# Patient Record
Sex: FEMALE | Race: WHITE | Employment: OTHER | URBAN - METROPOLITAN AREA
[De-identification: names, ages, dates, MRNs, and addresses within clinical notes are randomized per-mention and may not be internally consistent; named-entity substitution may affect disease eponyms.]

---

## 2019-05-03 ENCOUNTER — TRANSFERRED RECORDS (OUTPATIENT)
Dept: HEALTH INFORMATION MANAGEMENT | Facility: CLINIC | Age: 74
End: 2019-05-03

## 2020-06-13 ENCOUNTER — OFFICE VISIT (OUTPATIENT)
Dept: FAMILY MEDICINE | Facility: OTHER | Age: 75
End: 2020-06-13
Attending: NURSE PRACTITIONER
Payer: MEDICARE

## 2020-06-13 VITALS
HEART RATE: 76 BPM | WEIGHT: 180.6 LBS | OXYGEN SATURATION: 92 % | DIASTOLIC BLOOD PRESSURE: 80 MMHG | TEMPERATURE: 97.8 F | SYSTOLIC BLOOD PRESSURE: 122 MMHG | RESPIRATION RATE: 16 BRPM

## 2020-06-13 DIAGNOSIS — M10.079 ACUTE IDIOPATHIC GOUT OF FOOT, UNSPECIFIED LATERALITY: Primary | ICD-10-CM

## 2020-06-13 PROCEDURE — G0463 HOSPITAL OUTPT CLINIC VISIT: HCPCS

## 2020-06-13 PROCEDURE — 99203 OFFICE O/P NEW LOW 30 MIN: CPT | Performed by: NURSE PRACTITIONER

## 2020-06-13 RX ORDER — CALCITRIOL 0.5 UG/1
CAPSULE, LIQUID FILLED ORAL
COMMUNITY
Start: 2019-10-11

## 2020-06-13 RX ORDER — POTASSIUM CHLORIDE 1500 MG/1
TABLET, EXTENDED RELEASE ORAL
COMMUNITY
Start: 2019-12-17

## 2020-06-13 RX ORDER — PIOGLITAZONEHYDROCHLORIDE 15 MG/1
TABLET ORAL
COMMUNITY
Start: 2020-03-30

## 2020-06-13 RX ORDER — METOPROLOL SUCCINATE 50 MG/1
50 TABLET, EXTENDED RELEASE ORAL
COMMUNITY

## 2020-06-13 RX ORDER — BLOOD SUGAR DIAGNOSTIC
STRIP MISCELLANEOUS
COMMUNITY
Start: 2020-01-23

## 2020-06-13 RX ORDER — FUROSEMIDE 40 MG
TABLET ORAL
COMMUNITY
Start: 2020-03-30

## 2020-06-13 RX ORDER — GLIPIZIDE 5 MG/1
TABLET ORAL
COMMUNITY
Start: 2019-11-20

## 2020-06-13 RX ORDER — SULFASALAZINE 500 MG/1
1000 TABLET ORAL
COMMUNITY

## 2020-06-13 RX ORDER — ASPIRIN 81 MG/1
81 TABLET ORAL
COMMUNITY

## 2020-06-13 RX ORDER — NITROGLYCERIN 0.4 MG/1
0.4 TABLET SUBLINGUAL
COMMUNITY
Start: 2019-04-12

## 2020-06-13 RX ORDER — AMLODIPINE BESYLATE 10 MG/1
TABLET ORAL
COMMUNITY
Start: 2019-11-21

## 2020-06-13 RX ORDER — ROSUVASTATIN CALCIUM 5 MG/1
TABLET, COATED ORAL
COMMUNITY
Start: 2020-04-06

## 2020-06-13 RX ORDER — COLCHICINE 0.6 MG/1
TABLET ORAL
Qty: 20 TABLET | Refills: 0 | Status: SHIPPED | OUTPATIENT
Start: 2020-06-13

## 2020-06-13 RX ORDER — SULFASALAZINE 500 MG/1
TABLET ORAL
COMMUNITY
Start: 2020-06-09

## 2020-06-13 ASSESSMENT — PAIN SCALES - GENERAL: PAINLEVEL: EXTREME PAIN (9)

## 2020-06-13 NOTE — PATIENT INSTRUCTIONS
Patient Education     Treating Gout Attacks     Raising the joint above the level of your heart can help reduce gout symptoms.     Gout is a disease that affects the joints. It is caused by excess uric acid in your blood that may lead to crystals forming in your joints. Left untreated, it can lead to painful foot and joint deformities and even kidney problems. But, by treating gout early, you can relieve pain and help prevent future problems. Gout can usually be treated with medicine and proper diet. In severe cases, surgery may be needed.  Gout attacks are painful and often happen more than once. Taking medicines may reduce pain and prevent attacks in the future. There are also some things you can do at home to relieve symptoms.  Medicines for gout  Your healthcare provider may prescribe a daily medicine to reduce levels of uric acid. Reducing your uric acid levels may help prevent gout attacks. Allopurinol is one commonly used medicine taken daily to reduce uric acid levels. Other daily medicines used to reduce uric acid levels include febuxostat, lesinurad, and probencid. Other medicines can help relieve pain and swelling during an acute attack. Medicines such as NSAIDs (nonsteroidal anti-inflammatory medicines), steroids, and colchicine may be prescribed for intermittent use to relieve an acute gout attack. Be sure to take your medicine as directed.  What you can do  Below are some things you can do at home to relieve gout symptoms. Your healthcare provider may have other tips.    Rest the painful joint as much as you can.    Raise the painful joint so it is at a level higher than your heart.    Use ice for 10 minutes every 1 to 2 hours as possible.  How can I prevent gout?  With a little effort, you may be able to prevent gout attacks in the future. Here are some things you can do:    Don't eat foods high in purines  ? Certain meats (red meat, processed meat, turkey)  ? Organ meats (kidney, liver,  sweetbread)  ? Shellfish (lobster, crab, shrimp, scallop, mussel)  ? Certain fish (anchovy, sardine, herring, mackerel)    Take any medicines prescribed by your healthcare provider.    Lose weight if you need to.    Reduce high fructose corn syrup in meals and drinks.    Reduce or cut out alcohol, particularly beer, but also red wine and spirits.    Control blood pressure, diabetes, and cholesterol.    Drink plenty of water to help flush uric acid from your body.  Date Last Reviewed: 4/1/2018 2000-2019 [employer]. [address] [place] [address]. All rights reserved. This information is not intended as a substitute for professional medical care. Always follow your healthcare professional's instructions.

## 2020-06-13 NOTE — NURSING NOTE
Patient presents to the clinic today for possible gout.  Patient states she has had gout before and it feels similar.  Jo Weiss LPN 6/13/2020   4:12 PM    Chief Complaint   Patient presents with     Arthritis     Medication Reconciliation: complete  Jo Weiss LPN

## 2020-06-13 NOTE — PROGRESS NOTES
Nursing Notes:   Jo Weiss LPN  6/13/2020  4:19 PM  Signed  Patient presents to the clinic today for possible gout.  Patient states she has had gout before and it feels similar.  Jo Weiss LPN 6/13/2020   4:12 PM    Chief Complaint   Patient presents with     Arthritis     Medication Reconciliation: complete  Jo Weiss LPN      SUBJECTIVE:   Meseret Walker is a 74 year old female who presents to clinic today for the following health issues:    Patient [facility] to [facility] for evaluation of bilateral foot pain.  Her right is worse than her left.  It started a day ago.  It has rapidly worsened.  She does have a history of chronic kidney disease and gout.  She denies injury.  She reports right foot swelling and redness by her great toe.  She reports that her feet are so painful that she at bedtime is exquisitely tender.  She denies fevers, chills, signs or symptoms of systemic illness.      Problem list and histories reviewed & adjusted, as indicated.  Additional history: as documented    There is no problem list on file for this patient.    History reviewed. No pertinent surgical history.    Social History     Tobacco Use     Smoking status: Never Smoker     Smokeless tobacco: Never Used   Substance Use Topics     Alcohol use: Not Currently     History reviewed. No pertinent family history.      Current Outpatient Medications   Medication Sig Dispense Refill     amLODIPine (NORVASC) 10 MG tablet        aspirin 81 MG EC tablet Take 81 mg by mouth       calcitRIOL (ROCALTROL) 0.5 MCG capsule        Calcium Carb-Cholecalciferol 600-800 MG-UNIT CHEW Take 1 tablet by mouth       Calcium Citrate-Vitamin D 250-200 MG-UNIT TABS        colchicine (COLCYRS) 0.6 MG tablet Take 1 tablet and repeat in 2 hours. Then take one tablet daily for 5-7 days. 20 tablet 0     furosemide (LASIX) 40 MG tablet        glipiZIDE (GLUCOTROL) 5 MG tablet        metoprolol succinate ER (TOPROL-XL) 50 MG 24 hr  tablet Take 50 mg by mouth       nitroGLYcerin (NITROSTAT) 0.4 MG sublingual tablet Place 0.4 mg under the tongue       ONETOUCH VERIO IQ test strip CHECK GLUCOSE TWICE DAILY       pioglitazone (ACTOS) 15 MG tablet        potassium chloride ER (KLOR-CON M) 20 MEQ CR tablet        rosuvastatin (CRESTOR) 5 MG tablet        sulfaSALAzine (AZULFIDINE) 500 MG tablet Take 1,000 mg by mouth       sulfaSALAzine (AZULFIDINE) 500 MG tablet        Allergies   Allergen Reactions     Diagnostic X-Ray Materials Nausea     IV dye - flushed, nauseous     Seasonal Allergies          ROS:  Notable findings in the HPI.       OBJECTIVE:     /80 (BP Location: Right arm, Patient Position: Sitting, Cuff Size: Adult Regular)   Pulse 76   Temp 97.8  F (36.6  C) (Tympanic)   Resp 16   Wt 81.9 kg (180 lb 9.6 oz)   LMP  (LMP Unknown)   SpO2 92%   Breastfeeding No   There is no height or weight on file to calculate BMI.  GENERAL: healthy and alert  EYES: Eyes grossly normal to inspection  RESP: without increased work of breathing.   CV: peripheral pulses strong and 1+ right lower extremity pitting edema to foot     MS: Right foot more swollen than the left foot.  She does have some mild erythema around the base of the great toe on the right foot.  All her toes are tender to palpate.  Her ankle is not tender to palpate.  She does have pain on the mid foot right side.  Pulses are adequate on both sides.  Left foot without redness, swelling or bruising.  She does report pain in her toes.  SKIN: no suspicious lesions or rashes  NEURO: Normal strength and tone, mentation intact and speech normal  PSYCH: mentation appears normal, affect normal/bright    Diagnostic Test Results:  none     ASSESSMENT/PLAN:     1. Acute idiopathic gout of foot, unspecified laterality  - colchicine (COLCYRS) 0.6 MG tablet; Take 1 tablet and repeat in 2 hours. Then take one tablet daily for 5-7 days.  Dispense: 20 tablet; Refill: 0    PLAN:    Did discuss  treatment plan including using prednisone versus colchicine.  At this point patient would rather use colchicine.  Instructions as above.  Described expectation and management of gou  If she is not seeing improvement on the colchicine after 3 to 4 days she will stop this medication and start prednisone.  She has prednisone at home.  I did recommend that she take 40 mg daily for 5 days.  However at this point she will use home remedies such as elevation, ice, heat and start the colchicine.  She will follow-up if needed.    Followup:    If not improving or if condition worsens, follow up with your Primary Care Provider    I explained my diagnostic considerations and recommendations to the patient, who voiced understanding and agreement with the treatment plan. All questions were answered. We discussed potential side effects of any prescribed or recommended therapies, as well as expectations for response to treatments. She was advised to contact our office if there is no improvement or worsening of conditions or symptoms.  If s/s worsen or persist, patient will either come back or follow up with PCP.    Disclaimer:  This note consists of words and symbols derived from keyboarding, dictation, or using voice recognition software. As a result, there may be errors in the script that have gone undetected. Please consider this when interpreting information found in this note.      Amanda Li NP, 6/13/2020 4:40 PM

## 2020-07-07 ENCOUNTER — MEDICAL CORRESPONDENCE (OUTPATIENT)
Dept: HEALTH INFORMATION MANAGEMENT | Facility: OTHER | Age: 75
End: 2020-07-07

## 2020-07-07 DIAGNOSIS — R53.83 FATIGUE: Primary | ICD-10-CM

## 2020-07-07 DIAGNOSIS — Z79.899 ENCOUNTER FOR LONG-TERM (CURRENT) USE OF OTHER MEDICATIONS: ICD-10-CM

## 2020-07-07 LAB
ALBUMIN SERPL-MCNC: 4.1 G/DL (ref 3.5–5.7)
ALP SERPL-CCNC: 87 U/L (ref 34–104)
ALT SERPL W P-5'-P-CCNC: 9 U/L (ref 7–52)
ANION GAP SERPL CALCULATED.3IONS-SCNC: 9 MMOL/L (ref 3–14)
AST SERPL W P-5'-P-CCNC: 13 U/L (ref 13–39)
BASOPHILS # BLD AUTO: 0 10E9/L (ref 0–0.2)
BASOPHILS NFR BLD AUTO: 0.5 %
BILIRUB SERPL-MCNC: 0.4 MG/DL (ref 0.3–1)
BUN SERPL-MCNC: 23 MG/DL (ref 7–25)
CALCIUM SERPL-MCNC: 8.6 MG/DL (ref 8.6–10.3)
CHLORIDE SERPL-SCNC: 106 MMOL/L (ref 98–107)
CO2 SERPL-SCNC: 27 MMOL/L (ref 21–31)
CREAT SERPL-MCNC: 1.18 MG/DL (ref 0.6–1.2)
CRP SERPL-MCNC: 2 MG/L
DIFFERENTIAL METHOD BLD: ABNORMAL
EOSINOPHIL # BLD AUTO: 0.2 10E9/L (ref 0–0.7)
EOSINOPHIL NFR BLD AUTO: 2.7 %
ERYTHROCYTE [DISTWIDTH] IN BLOOD BY AUTOMATED COUNT: 13.8 % (ref 10–15)
ERYTHROCYTE [SEDIMENTATION RATE] IN BLOOD BY WESTERGREN METHOD: 51 MM/H (ref 1–15)
GFR SERPL CREATININE-BSD FRML MDRD: 45 ML/MIN/{1.73_M2}
GLUCOSE SERPL-MCNC: 172 MG/DL (ref 70–105)
HCT VFR BLD AUTO: 36.3 % (ref 35–47)
HGB BLD-MCNC: 11.3 G/DL (ref 11.7–15.7)
IMM GRANULOCYTES # BLD: 0 10E9/L (ref 0–0.4)
IMM GRANULOCYTES NFR BLD: 0.5 %
LYMPHOCYTES # BLD AUTO: 1 10E9/L (ref 0.8–5.3)
LYMPHOCYTES NFR BLD AUTO: 16.7 %
MCH RBC QN AUTO: 28.6 PG (ref 26.5–33)
MCHC RBC AUTO-ENTMCNC: 31.1 G/DL (ref 31.5–36.5)
MCV RBC AUTO: 92 FL (ref 78–100)
MONOCYTES # BLD AUTO: 0.5 10E9/L (ref 0–1.3)
MONOCYTES NFR BLD AUTO: 8.2 %
NEUTROPHILS # BLD AUTO: 4.3 10E9/L (ref 1.6–8.3)
NEUTROPHILS NFR BLD AUTO: 71.4 %
PLATELET # BLD AUTO: 271 10E9/L (ref 150–450)
POTASSIUM SERPL-SCNC: 3.9 MMOL/L (ref 3.5–5.1)
PROT SERPL-MCNC: 7.1 G/DL (ref 6.4–8.9)
RBC # BLD AUTO: 3.95 10E12/L (ref 3.8–5.2)
SODIUM SERPL-SCNC: 142 MMOL/L (ref 134–144)
URATE SERPL-MCNC: 4.9 MG/DL (ref 4.4–7.6)
WBC # BLD AUTO: 6 10E9/L (ref 4–11)

## 2020-07-07 PROCEDURE — 36415 COLL VENOUS BLD VENIPUNCTURE: CPT | Mod: ZL

## 2020-07-07 PROCEDURE — 85025 COMPLETE CBC W/AUTO DIFF WBC: CPT | Mod: ZL

## 2020-07-07 PROCEDURE — 86140 C-REACTIVE PROTEIN: CPT | Mod: ZL

## 2020-07-07 PROCEDURE — 84550 ASSAY OF BLOOD/URIC ACID: CPT | Mod: ZL

## 2020-07-07 PROCEDURE — 80053 COMPREHEN METABOLIC PANEL: CPT | Mod: ZL

## 2020-07-07 PROCEDURE — 85652 RBC SED RATE AUTOMATED: CPT | Mod: ZL

## 2020-09-21 ENCOUNTER — MEDICAL CORRESPONDENCE (OUTPATIENT)
Dept: HEALTH INFORMATION MANAGEMENT | Facility: OTHER | Age: 75
End: 2020-09-21

## 2020-09-21 DIAGNOSIS — L40.50 PSORIATIC ARTHROPATHY (H): Primary | ICD-10-CM

## 2020-09-21 LAB
ALBUMIN SERPL-MCNC: 4.3 G/DL (ref 3.5–5.7)
ALP SERPL-CCNC: 91 U/L (ref 34–104)
ALT SERPL W P-5'-P-CCNC: 8 U/L (ref 7–52)
ANION GAP SERPL CALCULATED.3IONS-SCNC: 9 MMOL/L (ref 3–14)
AST SERPL W P-5'-P-CCNC: 17 U/L (ref 13–39)
BASOPHILS # BLD AUTO: 0 10E9/L (ref 0–0.2)
BASOPHILS NFR BLD AUTO: 0.7 %
BILIRUB SERPL-MCNC: 0.3 MG/DL (ref 0.3–1)
BUN SERPL-MCNC: 20 MG/DL (ref 7–25)
CALCIUM SERPL-MCNC: 7.8 MG/DL (ref 8.6–10.3)
CHLORIDE SERPL-SCNC: 103 MMOL/L (ref 98–107)
CO2 SERPL-SCNC: 29 MMOL/L (ref 21–31)
CREAT SERPL-MCNC: 1.37 MG/DL (ref 0.6–1.2)
CRP SERPL-MCNC: 8.4 MG/L
DIFFERENTIAL METHOD BLD: NORMAL
EOSINOPHIL # BLD AUTO: 0.2 10E9/L (ref 0–0.7)
EOSINOPHIL NFR BLD AUTO: 2.7 %
ERYTHROCYTE [DISTWIDTH] IN BLOOD BY AUTOMATED COUNT: 14.3 % (ref 10–15)
ERYTHROCYTE [SEDIMENTATION RATE] IN BLOOD BY WESTERGREN METHOD: 44 MM/H (ref 1–15)
GFR SERPL CREATININE-BSD FRML MDRD: 38 ML/MIN/{1.73_M2}
GLUCOSE SERPL-MCNC: 118 MG/DL (ref 70–105)
HCT VFR BLD AUTO: 37.2 % (ref 35–47)
HGB BLD-MCNC: 11.8 G/DL (ref 11.7–15.7)
IMM GRANULOCYTES # BLD: 0 10E9/L (ref 0–0.4)
IMM GRANULOCYTES NFR BLD: 0.4 %
LYMPHOCYTES # BLD AUTO: 1.7 10E9/L (ref 0.8–5.3)
LYMPHOCYTES NFR BLD AUTO: 29.7 %
MCH RBC QN AUTO: 29.2 PG (ref 26.5–33)
MCHC RBC AUTO-ENTMCNC: 31.7 G/DL (ref 31.5–36.5)
MCV RBC AUTO: 92 FL (ref 78–100)
MONOCYTES # BLD AUTO: 0.4 10E9/L (ref 0–1.3)
MONOCYTES NFR BLD AUTO: 6.2 %
NEUTROPHILS # BLD AUTO: 3.4 10E9/L (ref 1.6–8.3)
NEUTROPHILS NFR BLD AUTO: 60.3 %
PLATELET # BLD AUTO: 324 10E9/L (ref 150–450)
POTASSIUM SERPL-SCNC: 3.9 MMOL/L (ref 3.5–5.1)
PROT SERPL-MCNC: 6.9 G/DL (ref 6.4–8.9)
RBC # BLD AUTO: 4.04 10E12/L (ref 3.8–5.2)
SODIUM SERPL-SCNC: 141 MMOL/L (ref 134–144)
WBC # BLD AUTO: 5.7 10E9/L (ref 4–11)

## 2020-09-21 PROCEDURE — 85652 RBC SED RATE AUTOMATED: CPT | Mod: ZL

## 2020-09-21 PROCEDURE — 85025 COMPLETE CBC W/AUTO DIFF WBC: CPT | Mod: ZL

## 2020-09-21 PROCEDURE — 80053 COMPREHEN METABOLIC PANEL: CPT | Mod: ZL

## 2020-09-21 PROCEDURE — 36415 COLL VENOUS BLD VENIPUNCTURE: CPT | Mod: ZL

## 2020-09-21 PROCEDURE — 86140 C-REACTIVE PROTEIN: CPT | Mod: ZL

## 2022-05-11 ENCOUNTER — HOSPITAL ENCOUNTER (INPATIENT)
Dept: HOSPITAL 11 - JP.SDS | Age: 77
LOS: 2 days | Discharge: HOME | DRG: 470 | End: 2022-05-13
Attending: ORTHOPAEDIC SURGERY | Admitting: ORTHOPAEDIC SURGERY
Payer: MEDICARE

## 2022-05-11 DIAGNOSIS — M17.11: Primary | ICD-10-CM

## 2022-05-11 DIAGNOSIS — R94.31: ICD-10-CM

## 2022-05-11 DIAGNOSIS — E83.51: ICD-10-CM

## 2022-05-11 DIAGNOSIS — D64.89: ICD-10-CM

## 2022-05-11 DIAGNOSIS — Z91.041: ICD-10-CM

## 2022-05-11 DIAGNOSIS — Z79.899: ICD-10-CM

## 2022-05-11 DIAGNOSIS — E11.9: ICD-10-CM

## 2022-05-11 DIAGNOSIS — I10: ICD-10-CM

## 2022-05-11 PROCEDURE — C1776 JOINT DEVICE (IMPLANTABLE): HCPCS

## 2022-05-11 PROCEDURE — C1713 ANCHOR/SCREW BN/BN,TIS/BN: HCPCS

## 2022-05-11 PROCEDURE — 0SRC0J9 REPLACEMENT OF RIGHT KNEE JOINT WITH SYNTHETIC SUBSTITUTE, CEMENTED, OPEN APPROACH: ICD-10-PCS | Performed by: ORTHOPAEDIC SURGERY

## 2022-05-11 RX ADMIN — Medication SCH APPLIC: at 06:13

## 2022-05-11 RX ADMIN — Medication SCH APPLIC: at 21:59

## 2022-05-11 RX ADMIN — POTASSIUM CHLORIDE SCH MEQ: 750 CAPSULE, EXTENDED RELEASE ORAL at 22:00

## 2022-05-11 RX ADMIN — Medication SCH TAB: at 21:59

## 2022-05-11 RX ADMIN — Medication SCH MG: at 22:00

## 2022-05-12 RX ADMIN — POTASSIUM CHLORIDE SCH MEQ: 750 CAPSULE, EXTENDED RELEASE ORAL at 21:05

## 2022-05-12 RX ADMIN — GLIPIZIDE SCH MG: 5 TABLET, FILM COATED, EXTENDED RELEASE ORAL at 08:29

## 2022-05-12 RX ADMIN — POTASSIUM CHLORIDE SCH MEQ: 750 CAPSULE, EXTENDED RELEASE ORAL at 08:29

## 2022-05-12 RX ADMIN — Medication SCH TAB: at 08:28

## 2022-05-12 RX ADMIN — Medication SCH MG: at 21:04

## 2022-05-12 RX ADMIN — Medication SCH APPLIC: at 21:03

## 2022-05-12 RX ADMIN — Medication SCH APPLIC: at 08:27

## 2022-05-12 RX ADMIN — Medication SCH MG: at 08:29

## 2022-05-12 RX ADMIN — Medication SCH TAB: at 21:03

## 2022-05-13 RX ADMIN — POTASSIUM CHLORIDE SCH MEQ: 750 CAPSULE, EXTENDED RELEASE ORAL at 08:33

## 2022-05-13 RX ADMIN — Medication SCH APPLIC: at 08:35

## 2022-05-13 RX ADMIN — Medication SCH TAB: at 08:33

## 2022-05-13 RX ADMIN — GLIPIZIDE SCH MG: 5 TABLET, FILM COATED, EXTENDED RELEASE ORAL at 08:50

## 2022-05-13 RX ADMIN — Medication SCH MG: at 08:50

## 2022-05-15 ENCOUNTER — HOSPITAL ENCOUNTER (EMERGENCY)
Dept: HOSPITAL 11 - JP.ED | Age: 77
Discharge: HOME | End: 2022-05-15
Payer: MEDICARE

## 2022-05-15 DIAGNOSIS — Z91.041: ICD-10-CM

## 2022-05-15 DIAGNOSIS — Z96.651: ICD-10-CM

## 2022-05-15 DIAGNOSIS — Z79.899: ICD-10-CM

## 2022-05-15 DIAGNOSIS — G89.18: Primary | ICD-10-CM

## 2022-05-15 DIAGNOSIS — M25.531: ICD-10-CM

## 2022-05-15 DIAGNOSIS — E11.9: ICD-10-CM

## 2022-05-15 DIAGNOSIS — E78.00: ICD-10-CM

## 2022-05-15 DIAGNOSIS — I10: ICD-10-CM

## 2022-05-15 DIAGNOSIS — L13.9: ICD-10-CM

## 2022-05-15 DIAGNOSIS — I25.2: ICD-10-CM

## 2022-05-15 DIAGNOSIS — Z79.82: ICD-10-CM

## 2022-05-15 DIAGNOSIS — E66.9: ICD-10-CM

## 2022-05-15 DIAGNOSIS — R60.0: ICD-10-CM

## 2023-07-07 ENCOUNTER — HOSPITAL ENCOUNTER (EMERGENCY)
Dept: HOSPITAL 11 - JP.ED | Age: 78
Discharge: HOME | End: 2023-07-07
Payer: MEDICARE

## 2023-07-07 DIAGNOSIS — Z79.4: ICD-10-CM

## 2023-07-07 DIAGNOSIS — I25.2: ICD-10-CM

## 2023-07-07 DIAGNOSIS — Z91.09: ICD-10-CM

## 2023-07-07 DIAGNOSIS — Z91.041: ICD-10-CM

## 2023-07-07 DIAGNOSIS — E66.9: ICD-10-CM

## 2023-07-07 DIAGNOSIS — Z95.5: ICD-10-CM

## 2023-07-07 DIAGNOSIS — E21.3: ICD-10-CM

## 2023-07-07 DIAGNOSIS — Z79.82: ICD-10-CM

## 2023-07-07 DIAGNOSIS — E11.9: ICD-10-CM

## 2023-07-07 DIAGNOSIS — R07.89: Primary | ICD-10-CM

## 2023-07-07 DIAGNOSIS — E78.00: ICD-10-CM

## 2023-07-07 DIAGNOSIS — Z79.899: ICD-10-CM

## 2023-07-07 LAB
ANION GAP SERPL CALC-SCNC: 11.5 MMOL/L (ref 5–14)
BASOPHILS # BLD AUTO: 0.03 K/UL (ref 0–0.1)
BASOPHILS NFR BLD AUTO: 0.4 % (ref 0.1–1.3)
BUN SERPL-MCNC: 27 MG/DL (ref 7–18)
CALCIUM SERPL-MCNC: 8.1 MG/DL (ref 8.5–10.1)
CHLORIDE SERPL-SCNC: 102 MMOL/L (ref 100–108)
CO2 SERPL-SCNC: 31 MMOL/L (ref 21–32)
CREAT CL 24H UR+SERPL-VRATE: 23.65 ML/MIN
CREAT SERPL-MCNC: 1.7 MG/DL (ref 0.6–1)
EOSINOPHIL # BLD AUTO: 0.11 K/UL (ref 0–0.4)
EOSINOPHIL NFR BLD AUTO: 1.3 % (ref 0–5.4)
GLUCOSE SERPL-MCNC: 104 MG/DL (ref 74–106)
HCT VFR BLD AUTO: 35.6 % (ref 34.3–46)
HGB BLD-MCNC: 11.5 G/DL (ref 11.2–15.5)
IMM GRANULOCYTES # BLD: 0.12 K/UL (ref 0–0.23)
IMM GRANULOCYTES NFR BLD: 1.5 % (ref 0–0.7)
LYMPHOCYTES # BLD AUTO: 1.83 K/UL (ref 0.8–3.3)
LYMPHOCYTES NFR BLD AUTO: 22.3 % (ref 11.4–47.7)
MCH RBC QN AUTO: 30.7 PG (ref 31.6–35.5)
MCHC RBC AUTO-ENTMCNC: 32.3 G/DL (ref 31.6–35.5)
MCHC RBC AUTO-ENTMCNC: 94.9 FL (ref 81.4–99)
MONOCYTES # BLD AUTO: 0.64 K/UL (ref 0.2–0.9)
MONOCYTES NFR BLD AUTO: 7.8 % (ref 3.3–12.6)
NEUTROPHILS # BLD AUTO: 5.48 K/UL (ref 1–7.6)
NEUTROPHILS NFR BLD AUTO: 66.7 % (ref 40–78.1)
PLATELET # BLD AUTO: 316 K/UL (ref 130–375)
POTASSIUM SERPL-SCNC: 3.5 MMOL/L (ref 3.6–5.2)
RBC # BLD AUTO: 3.75 M/UL (ref 3.77–5.24)
SODIUM SERPL-SCNC: 141 MMOL/L (ref 140–148)
TROPONIN I SERPL HS-MCNC: 11 PG/ML (ref ?–60.3)
WBC # BLD AUTO: 8.2 K/UL (ref 3.2–11)

## 2023-09-20 ENCOUNTER — HOSPITAL ENCOUNTER (OUTPATIENT)
Dept: HOSPITAL 11 - JP.SDS | Age: 78
Discharge: HOME | End: 2023-09-20
Attending: ORTHOPAEDIC SURGERY
Payer: MEDICARE

## 2023-09-20 DIAGNOSIS — G47.33: ICD-10-CM

## 2023-09-20 DIAGNOSIS — N18.9: ICD-10-CM

## 2023-09-20 DIAGNOSIS — S83.242A: Primary | ICD-10-CM

## 2023-09-20 DIAGNOSIS — I25.10: ICD-10-CM
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
## 2023-09-20 DIAGNOSIS — E11.22: ICD-10-CM

## 2023-09-20 DIAGNOSIS — I12.9: ICD-10-CM

## 2023-09-20 LAB
ANION GAP SERPL CALC-SCNC: 13.2 MMOL/L (ref 5–14)
BUN SERPL-MCNC: 24 MG/DL (ref 7–18)
CALCIUM SERPL-MCNC: 7.4 MG/DL (ref 8.5–10.1)
CHLORIDE SERPL-SCNC: 99 MMOL/L (ref 100–108)
CO2 SERPL-SCNC: 29 MMOL/L (ref 21–32)
CREAT CL 24H UR+SERPL-VRATE: 23.65 ML/MIN
CREAT SERPL-MCNC: 1.7 MG/DL (ref 0.6–1)
GLUCOSE SERPL-MCNC: 185 MG/DL (ref 74–106)
HCT VFR BLD AUTO: 38.5 % (ref 34.3–46)
HGB BLD-MCNC: 12.5 G/DL (ref 11.2–15.5)
MCH RBC QN AUTO: 29.5 PG (ref 31.6–35.5)
MCHC RBC AUTO-ENTMCNC: 32.5 G/DL (ref 31.6–35.5)
MCHC RBC AUTO-ENTMCNC: 90.8 FL (ref 81.4–99)
PLATELET # BLD AUTO: 358 K/UL (ref 130–375)
POTASSIUM SERPL-SCNC: 3.2 MMOL/L (ref 3.6–5.2)
RBC # BLD AUTO: 4.24 M/UL (ref 3.77–5.24)
SODIUM SERPL-SCNC: 138 MMOL/L (ref 140–148)
WBC # BLD AUTO: 10.2 K/UL (ref 3.2–11)

## 2023-09-20 PROCEDURE — 82947 ASSAY GLUCOSE BLOOD QUANT: CPT

## 2023-09-20 PROCEDURE — 94640 AIRWAY INHALATION TREATMENT: CPT

## 2023-09-20 PROCEDURE — 80048 BASIC METABOLIC PNL TOTAL CA: CPT

## 2023-09-20 PROCEDURE — 85027 COMPLETE CBC AUTOMATED: CPT

## 2023-09-20 PROCEDURE — 36415 COLL VENOUS BLD VENIPUNCTURE: CPT

## 2023-09-20 PROCEDURE — 29881 ARTHRS KNE SRG MNISECTMY M/L: CPT

## 2023-10-16 ENCOUNTER — HOSPITAL ENCOUNTER (INPATIENT)
Dept: HOSPITAL 11 - JP.SDS | Age: 78
LOS: 2 days | Discharge: HOME | DRG: 470 | End: 2023-10-18
Attending: ORTHOPAEDIC SURGERY | Admitting: ORTHOPAEDIC SURGERY
Payer: MEDICARE

## 2023-10-16 DIAGNOSIS — G47.33: ICD-10-CM

## 2023-10-16 DIAGNOSIS — Z79.4: ICD-10-CM

## 2023-10-16 DIAGNOSIS — E66.9: ICD-10-CM

## 2023-10-16 DIAGNOSIS — I25.2: ICD-10-CM

## 2023-10-16 DIAGNOSIS — I25.119: ICD-10-CM

## 2023-10-16 DIAGNOSIS — I12.9: ICD-10-CM

## 2023-10-16 DIAGNOSIS — Z87.01: ICD-10-CM

## 2023-10-16 DIAGNOSIS — E11.22: ICD-10-CM

## 2023-10-16 DIAGNOSIS — M17.12: Primary | ICD-10-CM

## 2023-10-16 DIAGNOSIS — E78.49: ICD-10-CM

## 2023-10-16 DIAGNOSIS — N18.32: ICD-10-CM

## 2023-10-16 DIAGNOSIS — Z79.82: ICD-10-CM

## 2023-10-16 DIAGNOSIS — M35.3: ICD-10-CM

## 2023-10-16 DIAGNOSIS — Z88.8: ICD-10-CM

## 2023-10-16 DIAGNOSIS — Z11.52: ICD-10-CM

## 2023-10-16 DIAGNOSIS — Z79.899: ICD-10-CM

## 2023-10-16 LAB
ALBUMIN SERPL-MCNC: 3.5 G/DL (ref 3.4–5)
ALBUMIN/GLOB SERPL: 0.9 {RATIO} (ref 1.2–2.2)
ALP SERPL-CCNC: 109 U/L (ref 46–116)
ALT SERPL-CCNC: 12 U/L (ref 12–78)
ANION GAP SERPL CALC-SCNC: 13.2 MMOL/L (ref 5–14)
AST SERPL-CCNC: 21 U/L (ref 15–37)
BILIRUB SERPL-MCNC: 0.3 MG/DL (ref 0.2–1)
BUN SERPL-MCNC: 21 MG/DL (ref 7–18)
CALCIUM SERPL-MCNC: 7.7 MG/DL (ref 8.5–10.1)
CHLORIDE SERPL-SCNC: 104 MMOL/L (ref 100–108)
CO2 SERPL-SCNC: 29 MMOL/L (ref 21–32)
CREAT CL 24H UR+SERPL-VRATE: 26.81 ML/MIN
CREAT SERPL-MCNC: 1.5 MG/DL (ref 0.6–1)
GLOBULIN SER-MCNC: 4.1 G/DL (ref 2.3–3.5)
GLUCOSE SERPL-MCNC: 152 MG/DL (ref 74–106)
HCT VFR BLD AUTO: 36.9 % (ref 34.3–46)
HGB BLD-MCNC: 11.8 G/DL (ref 11.2–15.5)
MCH RBC QN AUTO: 28.8 PG (ref 31.6–35.5)
MCHC RBC AUTO-ENTMCNC: 32 G/DL (ref 31.6–35.5)
MCHC RBC AUTO-ENTMCNC: 90 FL (ref 81.4–99)
PLATELET # BLD AUTO: 344 K/UL (ref 130–375)
POTASSIUM SERPL-SCNC: 3.2 MMOL/L (ref 3.6–5.2)
PROT SERPL-MCNC: 7.6 G/DL (ref 6.4–8.2)
RBC # BLD AUTO: 4.1 M/UL (ref 3.77–5.24)
SODIUM SERPL-SCNC: 143 MMOL/L (ref 140–148)
WBC # BLD AUTO: 8 K/UL (ref 3.2–11)

## 2023-10-16 PROCEDURE — C1713 ANCHOR/SCREW BN/BN,TIS/BN: HCPCS

## 2023-10-16 PROCEDURE — U0002 COVID-19 LAB TEST NON-CDC: HCPCS

## 2023-10-16 PROCEDURE — 0SRD0J9 REPLACEMENT OF LEFT KNEE JOINT WITH SYNTHETIC SUBSTITUTE, CEMENTED, OPEN APPROACH: ICD-10-PCS | Performed by: ORTHOPAEDIC SURGERY

## 2023-10-16 PROCEDURE — C1776 JOINT DEVICE (IMPLANTABLE): HCPCS

## 2023-10-16 RX ADMIN — ASPIRIN SCH MG: 325 TABLET, DELAYED RELEASE ORAL at 22:02

## 2023-10-16 RX ADMIN — Medication SCH TAB: at 20:15

## 2023-10-16 RX ADMIN — Medication SCH APPLIC: at 08:59

## 2023-10-16 RX ADMIN — Medication SCH APPLIC: at 20:13

## 2023-10-16 RX ADMIN — Medication SCH: at 15:25

## 2023-10-17 RX ADMIN — Medication SCH TAB: at 08:48

## 2023-10-17 RX ADMIN — ASPIRIN SCH MG: 325 TABLET, DELAYED RELEASE ORAL at 20:15

## 2023-10-17 RX ADMIN — Medication SCH APPLIC: at 20:15

## 2023-10-17 RX ADMIN — POTASSIUM CHLORIDE SCH MEQ: 750 CAPSULE, EXTENDED RELEASE ORAL at 17:34

## 2023-10-17 RX ADMIN — GLIPIZIDE SCH MG: 5 TABLET, FILM COATED, EXTENDED RELEASE ORAL at 08:48

## 2023-10-17 RX ADMIN — Medication SCH TAB: at 20:16

## 2023-10-17 RX ADMIN — Medication SCH APPLIC: at 08:48

## 2023-10-17 RX ADMIN — Medication SCH: at 20:16

## 2023-10-17 RX ADMIN — ASPIRIN SCH MG: 325 TABLET, DELAYED RELEASE ORAL at 08:48

## 2023-10-17 RX ADMIN — POTASSIUM CHLORIDE SCH MEQ: 750 CAPSULE, EXTENDED RELEASE ORAL at 08:48

## 2023-10-18 LAB
ANION GAP SERPL CALC-SCNC: 12.5 MMOL/L (ref 5–14)
BUN SERPL-MCNC: 26 MG/DL (ref 7–18)
CALCIUM SERPL-MCNC: 7.4 MG/DL (ref 8.5–10.1)
CHLORIDE SERPL-SCNC: 105 MMOL/L (ref 100–108)
CO2 SERPL-SCNC: 28 MMOL/L (ref 21–32)
CREAT CL 24H UR+SERPL-VRATE: 26.81 ML/MIN
CREAT SERPL-MCNC: 1.5 MG/DL (ref 0.6–1)
GLUCOSE SERPL-MCNC: 183 MG/DL (ref 74–106)
POTASSIUM SERPL-SCNC: 3.5 MMOL/L (ref 3.6–5.2)
SODIUM SERPL-SCNC: 142 MMOL/L (ref 140–148)

## 2023-10-18 RX ADMIN — ASPIRIN SCH MG: 325 TABLET, DELAYED RELEASE ORAL at 09:06

## 2023-10-18 RX ADMIN — POTASSIUM CHLORIDE SCH MEQ: 750 CAPSULE, EXTENDED RELEASE ORAL at 17:12

## 2023-10-18 RX ADMIN — POTASSIUM CHLORIDE SCH MEQ: 750 CAPSULE, EXTENDED RELEASE ORAL at 07:56

## 2023-10-18 RX ADMIN — Medication SCH APPLIC: at 09:06

## 2023-10-18 RX ADMIN — GLIPIZIDE SCH MG: 5 TABLET, FILM COATED, EXTENDED RELEASE ORAL at 09:06

## 2023-10-18 RX ADMIN — Medication SCH TAB: at 09:07

## 2025-01-29 ENCOUNTER — TELEPHONE (OUTPATIENT)
Dept: FAMILY MEDICINE | Facility: OTHER | Age: 80
End: 2025-01-29
Payer: MEDICARE

## 2025-01-29 DIAGNOSIS — M06.9 RHEUMATOID ARTHRITIS INVOLVING MULTIPLE JOINTS (H): Primary | ICD-10-CM

## 2025-01-31 NOTE — TELEPHONE ENCOUNTER
Called patient, no answer, left voicemail stating purpose of call, will try to call back later to discuss how she is doing and potential treatment options moving forward.

## 2025-02-04 RX ORDER — PREDNISONE 5 MG/1
TABLET ORAL
Qty: 126 TABLET | Refills: 0 | Status: SHIPPED | OUTPATIENT
Start: 2025-02-04 | End: 2025-04-01

## 2025-02-04 NOTE — TELEPHONE ENCOUNTER
Spoke with Meseret, she received a letter from her insurance company stating that Hyrimoz is not covered, however Hadlima or Simlandi would be a covered option.  Enbrel, Actemra, Kevzara, Xeljanz, Rinvoq are also covered options.    He is still in [place].  Will plan to order the Hadlima for her, as well as a prednisone burst sent to [facility] in [place] (20 mg tapering by 5 mg every 7 days until off).  She does have some disease activity currently off of any immunosuppressive medication.    She will ask her PCP for a referral, and plan to schedule a follow-up appointment with me in 3-4 months (will take about 3 months to see the full effect from the medication change).  No other questions or concerns.